# Patient Record
Sex: FEMALE | Race: OTHER | URBAN - METROPOLITAN AREA
[De-identification: names, ages, dates, MRNs, and addresses within clinical notes are randomized per-mention and may not be internally consistent; named-entity substitution may affect disease eponyms.]

---

## 2020-01-20 ENCOUNTER — EMERGENCY (EMERGENCY)
Facility: HOSPITAL | Age: 25
LOS: 0 days | Discharge: HOME | End: 2020-01-20
Admitting: EMERGENCY MEDICINE
Payer: COMMERCIAL

## 2020-01-20 VITALS
TEMPERATURE: 97 F | DIASTOLIC BLOOD PRESSURE: 76 MMHG | SYSTOLIC BLOOD PRESSURE: 132 MMHG | OXYGEN SATURATION: 96 % | RESPIRATION RATE: 20 BRPM | HEART RATE: 95 BPM
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
## 2020-01-20 DIAGNOSIS — H57.89 OTHER SPECIFIED DISORDERS OF EYE AND ADNEXA: ICD-10-CM
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
## 2020-01-20 DIAGNOSIS — H11.31 CONJUNCTIVAL HEMORRHAGE, RIGHT EYE: ICD-10-CM
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
## 2020-01-20 PROCEDURE — 99283 EMERGENCY DEPT VISIT LOW MDM: CPT

## 2020-01-20 RX ORDER — POLYMYXIN B SULF/TRIMETHOPRIM 10000-1/ML
1 DROPS OPHTHALMIC (EYE) ONCE
Refills: 0 | Status: COMPLETED | OUTPATIENT
Start: 2020-01-20 | End: 2020-01-20

## 2020-01-20 RX ADMIN — Medication 1 DROP(S): at 15:46

## 2020-01-20 NOTE — ED PROVIDER NOTE - NSFOLLOWUPINSTRUCTIONS_ED_ALL_ED_FT
Subconjunctival Hemorrhage    WHAT YOU NEED TO KNOW:    A subconjunctival hemorrhage is when blood collects under the conjunctiva in your eye. The conjunctiva is the clear lining that covers the white part of your eye. The blood comes from broken blood vessels under the conjunctiva.    DISCHARGE INSTRUCTIONS:    Care for your eye:     Cold or warm compress: Use a cold pack during the first 24 hours. Ask how often to apply it and for how long each time. After the first 24 hours, apply a warm pack on your eye. Do this 3 times each day for about 10 to 15 minutes each time.      Eyedrops: You may need artificial tears to keep your eye moist. Use the drops as directed.Steps 1 2 3 4         Follow up with your healthcare provider or eye specialist as directed: Write down your questions so you remember to ask them during your visits.    Contact your healthcare provider or eye specialist if:     The redness in your eye has not gone away after 3 weeks.      You have another subconjunctival hemorrhage.      You have subconjunctival hemorrhages in both eyes.      You have questions or concerns about your condition or care.    Return to the emergency department if:     You have eye pain or sensitivity to light.      Your vision changes.      You have white or yellow discharge from your eye.         © Copyright [employer] 2019 All illustrations and images included in CareNotes are the copyrighted property of [employer] or [employer].

## 2020-01-20 NOTE — ED ADULT NURSE NOTE - OBJECTIVE STATEMENT
Patient c/o right eye pain and redness since this morning. Patient states she was putting air in her tires and hit face on car mirror. On assessment eye redness noted, denies vision changed. Patient states eye is sensitive to light. No obvious signs of deformity noted to eye + redness present. Denies n/v/f/c/d.

## 2020-01-20 NOTE — ED PROVIDER NOTE - NS ED ROS FT
Review of Systems:  	•	CONSTITUTIONAL - no fever, no diaphoresis, no chills  	•	SKIN - no rash  	•	HEMATOLOGIC - no bleeding, no bruising  	•	EYES -+ right eye redness  	•	ENT - no change in hearing, no sore throat, no ear pain or tinnitus  	•	MUSCULOSKELETAL - no joint paint, no swelling  	•	NEUROLOGIC - no weakness, no headache, no paresthesias, no LOC

## 2020-01-20 NOTE — ED PROVIDER NOTE - CLINICAL SUMMARY MEDICAL DECISION MAKING FREE TEXT BOX
Pt with injury to right eye, + small subconjunctival hemorrhage noted. no vision changes. VA 20/20 bilaterally, will follow up with [facility]

## 2020-01-20 NOTE — ED PROVIDER NOTE - PHYSICAL EXAMINATION
CONST: Well appearing in NAD  EYES: PERRL, EOMI, + small area of redness to right lateral eye, subconjunctival hemorrhage. conjunctiva clear. Vision 20/20 bilaterally. no corneal abrasion or reina sign with flourstein stain.   ENT: No nasal discharge. TM's clear. Oropharynx normal appearing, no erythema or exudates. No abscess or swelling. Uvula midline. No temporal artery or mastoid tenderness.  NECK: Non-tender, no meningeal signs. normal ROM. supple   SKIN: Warm, dry, no acute rashes. Good turgor  NEURO: A&Ox3, No focal deficits. Strength 5/5 with no sensory deficits. Steady gait. Finger to nose intact. Negative pronator drift

## 2020-01-20 NOTE — ED PROVIDER NOTE - PATIENT PORTAL LINK FT
You can access the FollowMyHealth Patient Portal offered by [facility] by registering at the following website: [email or web address]. By joining [employer]’s FollowMyHealth portal, you will also be able to view your health information using other applications (apps) compatible with our system.

## 2020-01-20 NOTE — ED PROVIDER NOTE - OBJECTIVE STATEMENT
24 year old female with no pmhx presents with right eye redness since this morning. pt admits hit right eye and side of face to mirror on car. pt denies loc, HA, dizziness, vision changes, or pain.

## 2020-01-20 NOTE — ED ADULT TRIAGE NOTE - CHIEF COMPLAINT QUOTE
Patient states she hit herself in the right eye accidentally. +redness to right eye. Denies vision change.
